# Patient Record
Sex: MALE | Race: WHITE | NOT HISPANIC OR LATINO | URBAN - METROPOLITAN AREA
[De-identification: names, ages, dates, MRNs, and addresses within clinical notes are randomized per-mention and may not be internally consistent; named-entity substitution may affect disease eponyms.]

---

## 2022-09-06 ENCOUNTER — LAB OUTSIDE AN ENCOUNTER (OUTPATIENT)
Dept: URBAN - METROPOLITAN AREA CLINIC 88 | Facility: CLINIC | Age: 72
End: 2022-09-06

## 2022-09-06 ENCOUNTER — WEB ENCOUNTER (OUTPATIENT)
Dept: URBAN - METROPOLITAN AREA CLINIC 88 | Facility: CLINIC | Age: 72
End: 2022-09-06

## 2022-09-06 ENCOUNTER — OFFICE VISIT (OUTPATIENT)
Dept: URBAN - METROPOLITAN AREA CLINIC 88 | Facility: CLINIC | Age: 72
End: 2022-09-06
Payer: MEDICARE

## 2022-09-06 VITALS
HEART RATE: 85 BPM | TEMPERATURE: 97.5 F | HEIGHT: 74 IN | BODY MASS INDEX: 24.26 KG/M2 | SYSTOLIC BLOOD PRESSURE: 142 MMHG | WEIGHT: 189 LBS | DIASTOLIC BLOOD PRESSURE: 84 MMHG

## 2022-09-06 DIAGNOSIS — R10.32 LEFT LOWER QUADRANT ABDOMINAL PAIN: ICD-10-CM

## 2022-09-06 DIAGNOSIS — K59.00 COLONIC CONSTIPATION: ICD-10-CM

## 2022-09-06 DIAGNOSIS — Z86.010 PERSONAL HISTORY OF COLONIC POLYPS: ICD-10-CM

## 2022-09-06 DIAGNOSIS — K57.90 DIVERTICULOSIS: ICD-10-CM

## 2022-09-06 PROBLEM — 397881000: Status: ACTIVE | Noted: 2022-09-06

## 2022-09-06 PROCEDURE — 99204 OFFICE O/P NEW MOD 45 MIN: CPT | Performed by: NURSE PRACTITIONER

## 2022-09-06 NOTE — HPI-TODAY'S VISIT:
Presents today for evaluation of LLQ pain that started 4 days ago.  Describes it as an intermittent gas sensation that has progressed over time.  Due to increase in pain to sharp sensation, states he had to take a dose of pain medication during last night.  Currently describes it as a constant dull ache that is aggravated by movement in general.  Denies fever, chills, rectal bleeding or diarrhea.  Over the last year, has noticed more constipation.  Stools are firm with straining voiced.  Defecation occurs about 3x/week.  Denies use of medication to improve this complaint. Last Colonoscopy in 08/2019:  7 mm adenoma polyp, left sided diverticulosis, normal TI.  [5 year recall]

## 2022-09-08 ENCOUNTER — TELEPHONE ENCOUNTER (OUTPATIENT)
Dept: URBAN - METROPOLITAN AREA CLINIC 70 | Facility: CLINIC | Age: 72
End: 2022-09-08

## 2022-09-08 PROBLEM — 4494009: Status: ACTIVE | Noted: 2022-09-08

## 2022-09-08 RX ORDER — METRONIDAZOLE 500 MG/1
1 TABLET TABLET, FILM COATED ORAL
Qty: 21 TABLET | Refills: 0 | OUTPATIENT
Start: 2022-09-08 | End: 2022-09-15

## 2022-09-08 RX ORDER — CIPROFLOXACIN 500 MG/1
1 TABLET TABLET, FILM COATED ORAL
Qty: 14 | Refills: 0 | OUTPATIENT
Start: 2022-09-08 | End: 2022-09-15

## 2022-10-18 ENCOUNTER — OFFICE VISIT (OUTPATIENT)
Dept: URBAN - METROPOLITAN AREA CLINIC 88 | Facility: CLINIC | Age: 72
End: 2022-10-18
Payer: MEDICARE

## 2022-10-18 ENCOUNTER — DASHBOARD ENCOUNTERS (OUTPATIENT)
Age: 72
End: 2022-10-18

## 2022-10-18 VITALS
BODY MASS INDEX: 24.59 KG/M2 | TEMPERATURE: 97.2 F | SYSTOLIC BLOOD PRESSURE: 114 MMHG | WEIGHT: 191.6 LBS | DIASTOLIC BLOOD PRESSURE: 71 MMHG | HEART RATE: 82 BPM | HEIGHT: 74 IN

## 2022-10-18 DIAGNOSIS — Z86.010 PERSONAL HISTORY OF COLONIC POLYPS: ICD-10-CM

## 2022-10-18 DIAGNOSIS — K59.09 CHANGE IN BOWEL MOVEMENTS INTERMITTENT CONSTIPATION. URGENCY IN THE MORNING.: ICD-10-CM

## 2022-10-18 DIAGNOSIS — K57.92 DIVERTICULITIS: ICD-10-CM

## 2022-10-18 PROBLEM — 307496006: Status: ACTIVE | Noted: 2022-10-18

## 2022-10-18 PROBLEM — 35298007: Status: ACTIVE | Noted: 2022-09-06

## 2022-10-18 PROBLEM — 428283002: Status: ACTIVE | Noted: 2022-09-06

## 2022-10-18 PROCEDURE — 99213 OFFICE O/P EST LOW 20 MIN: CPT | Performed by: NURSE PRACTITIONER

## 2022-10-18 NOTE — HPI-TODAY'S VISIT:
Patient presents today for follow up regarding LLQ pain.  Underwent CT A/P on 09/07/2022 with findings of acute diverticulitis to descending colon.  Patietn was contacted regarding results, but states abdominal pain was starting to improve even the day of the scan.  Antibiotics were prescribed but patient did not start medication due to pain resolving.  Continues to deny pain to this region today.  Has started Benefiber since LOV, which has helped to improve bowel consistency and habits.  Last Colonoscopy in 08/2019:  7 mm adenoma polyp, left sided diverticulosis, normal TI.  [5 year recall]

## 2022-10-18 NOTE — HPI-OTHER HISTORIES
------------------------------------------------------------ Last office note 09/06/2022: Presents today for evaluation of LLQ pain that started 4 days ago.  Describes it as an intermittent gas sensation that has progressed over time.  Due to increase in pain to sharp sensation, states he had to take a dose of pain medication during last night.  Currently describes it as a constant dull ache that is aggravated by movement in general.  Denies fever, chills, rectal bleeding or diarrhea.  Over the last year, has noticed more constipation.  Stools are firm with straining voiced.  Defecation occurs about 3x/week.  Denies use of medication to improve this complaint. Last Colonoscopy in 08/2019:  7 mm adenoma polyp, left sided diverticulosis, normal TI.  [5 year recall]